# Patient Record
Sex: MALE | Race: WHITE | HISPANIC OR LATINO | URBAN - METROPOLITAN AREA
[De-identification: names, ages, dates, MRNs, and addresses within clinical notes are randomized per-mention and may not be internally consistent; named-entity substitution may affect disease eponyms.]

---

## 2017-04-16 ENCOUNTER — EMERGENCY (EMERGENCY)
Facility: HOSPITAL | Age: 57
LOS: 1 days | Discharge: HOME | End: 2017-04-16
Attending: EMERGENCY MEDICINE | Admitting: EMERGENCY MEDICINE
Payer: COMMERCIAL

## 2017-04-16 VITALS
OXYGEN SATURATION: 96 % | DIASTOLIC BLOOD PRESSURE: 82 MMHG | RESPIRATION RATE: 18 BRPM | HEART RATE: 88 BPM | SYSTOLIC BLOOD PRESSURE: 123 MMHG | TEMPERATURE: 98 F

## 2017-04-16 VITALS
OXYGEN SATURATION: 95 % | TEMPERATURE: 98 F | RESPIRATION RATE: 16 BRPM | DIASTOLIC BLOOD PRESSURE: 81 MMHG | HEART RATE: 83 BPM | SYSTOLIC BLOOD PRESSURE: 107 MMHG

## 2017-04-16 DIAGNOSIS — I48.2 CHRONIC ATRIAL FIBRILLATION: ICD-10-CM

## 2017-04-16 DIAGNOSIS — K92.1 MELENA: ICD-10-CM

## 2017-04-16 DIAGNOSIS — R61 GENERALIZED HYPERHIDROSIS: ICD-10-CM

## 2017-04-16 DIAGNOSIS — E86.0 DEHYDRATION: ICD-10-CM

## 2017-04-16 DIAGNOSIS — R00.2 PALPITATIONS: ICD-10-CM

## 2017-04-16 DIAGNOSIS — Z79.82 LONG TERM (CURRENT) USE OF ASPIRIN: ICD-10-CM

## 2017-04-16 LAB
ALBUMIN SERPL ELPH-MCNC: 4.1 G/DL — SIGNIFICANT CHANGE UP (ref 3.3–5)
ALP SERPL-CCNC: 59 U/L — SIGNIFICANT CHANGE UP (ref 40–120)
ALT FLD-CCNC: 24 U/L RC — SIGNIFICANT CHANGE UP (ref 10–45)
ANION GAP SERPL CALC-SCNC: 10 MMOL/L — SIGNIFICANT CHANGE UP (ref 5–17)
APTT BLD: 34.7 SEC — SIGNIFICANT CHANGE UP (ref 27.5–37.4)
AST SERPL-CCNC: 29 U/L — SIGNIFICANT CHANGE UP (ref 10–40)
BASOPHILS # BLD AUTO: 0 K/UL — SIGNIFICANT CHANGE UP (ref 0–0.2)
BASOPHILS NFR BLD AUTO: 0 % — SIGNIFICANT CHANGE UP (ref 0–2)
BILIRUB SERPL-MCNC: 0.8 MG/DL — SIGNIFICANT CHANGE UP (ref 0.2–1.2)
BUN SERPL-MCNC: 10 MG/DL — SIGNIFICANT CHANGE UP (ref 7–23)
CALCIUM SERPL-MCNC: 9.2 MG/DL — SIGNIFICANT CHANGE UP (ref 8.4–10.5)
CHLORIDE SERPL-SCNC: 106 MMOL/L — SIGNIFICANT CHANGE UP (ref 96–108)
CO2 SERPL-SCNC: 27 MMOL/L — SIGNIFICANT CHANGE UP (ref 22–31)
CREAT SERPL-MCNC: 0.89 MG/DL — SIGNIFICANT CHANGE UP (ref 0.5–1.3)
EOSINOPHIL # BLD AUTO: 0 K/UL — SIGNIFICANT CHANGE UP (ref 0–0.5)
EOSINOPHIL NFR BLD AUTO: 0.4 % — SIGNIFICANT CHANGE UP (ref 0–6)
GAS PNL BLDV: SIGNIFICANT CHANGE UP
GLUCOSE SERPL-MCNC: 97 MG/DL — SIGNIFICANT CHANGE UP (ref 70–99)
HCT VFR BLD CALC: 45.7 % — SIGNIFICANT CHANGE UP (ref 39–50)
HGB BLD-MCNC: 15.7 G/DL — SIGNIFICANT CHANGE UP (ref 13–17)
INR BLD: 1.19 RATIO — HIGH (ref 0.88–1.16)
LYMPHOCYTES # BLD AUTO: 1.2 K/UL — SIGNIFICANT CHANGE UP (ref 1–3.3)
LYMPHOCYTES # BLD AUTO: 26.8 % — SIGNIFICANT CHANGE UP (ref 13–44)
MAGNESIUM SERPL-MCNC: 2 MG/DL — SIGNIFICANT CHANGE UP (ref 1.6–2.6)
MCHC RBC-ENTMCNC: 31 PG — SIGNIFICANT CHANGE UP (ref 27–34)
MCHC RBC-ENTMCNC: 34.5 GM/DL — SIGNIFICANT CHANGE UP (ref 32–36)
MCV RBC AUTO: 89.9 FL — SIGNIFICANT CHANGE UP (ref 80–100)
MONOCYTES # BLD AUTO: 0.5 K/UL — SIGNIFICANT CHANGE UP (ref 0–0.9)
MONOCYTES NFR BLD AUTO: 12 % — SIGNIFICANT CHANGE UP (ref 2–14)
NEUTROPHILS # BLD AUTO: 2.8 K/UL — SIGNIFICANT CHANGE UP (ref 1.8–7.4)
NEUTROPHILS NFR BLD AUTO: 60.8 % — SIGNIFICANT CHANGE UP (ref 43–77)
PLATELET # BLD AUTO: 118 K/UL — LOW (ref 150–400)
POTASSIUM SERPL-MCNC: 3.7 MMOL/L — SIGNIFICANT CHANGE UP (ref 3.5–5.3)
POTASSIUM SERPL-SCNC: 3.7 MMOL/L — SIGNIFICANT CHANGE UP (ref 3.5–5.3)
PROT SERPL-MCNC: 7 G/DL — SIGNIFICANT CHANGE UP (ref 6–8.3)
PROTHROM AB SERPL-ACNC: 13 SEC — HIGH (ref 9.8–12.7)
RBC # BLD: 5.08 M/UL — SIGNIFICANT CHANGE UP (ref 4.2–5.8)
RBC # FLD: 12 % — SIGNIFICANT CHANGE UP (ref 10.3–14.5)
SODIUM SERPL-SCNC: 143 MMOL/L — SIGNIFICANT CHANGE UP (ref 135–145)
TROPONIN T SERPL-MCNC: <0.01 NG/ML — SIGNIFICANT CHANGE UP (ref 0–0.06)
TSH SERPL-MCNC: 1.92 UIU/ML — SIGNIFICANT CHANGE UP (ref 0.27–4.2)
WBC # BLD: 4.5 K/UL — SIGNIFICANT CHANGE UP (ref 3.8–10.5)
WBC # FLD AUTO: 4.5 K/UL — SIGNIFICANT CHANGE UP (ref 3.8–10.5)

## 2017-04-16 PROCEDURE — 71010: CPT | Mod: 26

## 2017-04-16 PROCEDURE — 82435 ASSAY OF BLOOD CHLORIDE: CPT

## 2017-04-16 PROCEDURE — 82330 ASSAY OF CALCIUM: CPT

## 2017-04-16 PROCEDURE — 99285 EMERGENCY DEPT VISIT HI MDM: CPT | Mod: 25

## 2017-04-16 PROCEDURE — 93010 ELECTROCARDIOGRAM REPORT: CPT | Mod: 76

## 2017-04-16 PROCEDURE — 85014 HEMATOCRIT: CPT

## 2017-04-16 PROCEDURE — 85027 COMPLETE CBC AUTOMATED: CPT

## 2017-04-16 PROCEDURE — 84443 ASSAY THYROID STIM HORMONE: CPT

## 2017-04-16 PROCEDURE — 93005 ELECTROCARDIOGRAM TRACING: CPT

## 2017-04-16 PROCEDURE — 85610 PROTHROMBIN TIME: CPT

## 2017-04-16 PROCEDURE — 84484 ASSAY OF TROPONIN QUANT: CPT

## 2017-04-16 PROCEDURE — 84295 ASSAY OF SERUM SODIUM: CPT

## 2017-04-16 PROCEDURE — 83605 ASSAY OF LACTIC ACID: CPT

## 2017-04-16 PROCEDURE — 84132 ASSAY OF SERUM POTASSIUM: CPT

## 2017-04-16 PROCEDURE — 82803 BLOOD GASES ANY COMBINATION: CPT

## 2017-04-16 PROCEDURE — 71045 X-RAY EXAM CHEST 1 VIEW: CPT

## 2017-04-16 PROCEDURE — 99284 EMERGENCY DEPT VISIT MOD MDM: CPT | Mod: 25

## 2017-04-16 PROCEDURE — 82553 CREATINE MB FRACTION: CPT

## 2017-04-16 PROCEDURE — 83735 ASSAY OF MAGNESIUM: CPT

## 2017-04-16 PROCEDURE — 82947 ASSAY GLUCOSE BLOOD QUANT: CPT

## 2017-04-16 PROCEDURE — 80053 COMPREHEN METABOLIC PANEL: CPT

## 2017-04-16 PROCEDURE — 85730 THROMBOPLASTIN TIME PARTIAL: CPT

## 2017-04-16 PROCEDURE — 82550 ASSAY OF CK (CPK): CPT

## 2017-04-16 RX ORDER — SODIUM CHLORIDE 9 MG/ML
1000 INJECTION INTRAMUSCULAR; INTRAVENOUS; SUBCUTANEOUS ONCE
Qty: 0 | Refills: 0 | Status: COMPLETED | OUTPATIENT
Start: 2017-04-16 | End: 2017-04-16

## 2017-04-16 RX ORDER — SODIUM CHLORIDE 9 MG/ML
1000 INJECTION INTRAMUSCULAR; INTRAVENOUS; SUBCUTANEOUS
Qty: 0 | Refills: 0 | Status: DISCONTINUED | OUTPATIENT
Start: 2017-04-16 | End: 2017-04-20

## 2017-04-16 RX ORDER — ASPIRIN/CALCIUM CARB/MAGNESIUM 324 MG
1 TABLET ORAL
Qty: 0 | Refills: 0 | COMMUNITY

## 2017-04-16 RX ADMIN — SODIUM CHLORIDE 1000 MILLILITER(S): 9 INJECTION INTRAMUSCULAR; INTRAVENOUS; SUBCUTANEOUS at 12:54

## 2017-04-16 RX ADMIN — SODIUM CHLORIDE 125 MILLILITER(S): 9 INJECTION INTRAMUSCULAR; INTRAVENOUS; SUBCUTANEOUS at 16:13

## 2017-04-16 RX ADMIN — SODIUM CHLORIDE 1000 MILLILITER(S): 9 INJECTION INTRAMUSCULAR; INTRAVENOUS; SUBCUTANEOUS at 14:00

## 2017-04-16 NOTE — ED PROVIDER NOTE - SHIFT CHANGE DETAILS
**ATTENDING ADDENDUM (Dr. Jamie Nuñez): (1) reassess patient (2) followup delta troponin (3) disposition pending at time of ED handoff, but likely discharge home (low [id number] score; disposition as per discussion with patient's primary care physician/provider/cardiologist)

## 2017-04-16 NOTE — ED PROVIDER NOTE - CARE PLAN
Principal Discharge DX:	Tachycardia  Secondary Diagnosis:	Chronic atrial fibrillation  Secondary Diagnosis:	Dehydration Principal Discharge DX:	Tachycardia  Goal:	ATTENDING ADDENDUM (Dr. Jamie Nuñez): Goals of care include resolution of emergent/urgent symptoms and concerns, and restoration to baseline level of homeostasis.  Instructions for follow-up, activity and diet:	ATTENDING ADDENDUM (Dr. Jamie Nuñez): (1) anticipatory guidance provided  (2) rest  (3) outpatient follow-up with your primary care physician/provider (4) return if symptoms worsen, persist, or do not resolve (5) medications, if indicated, as prescribed  Secondary Diagnosis:	Chronic atrial fibrillation  Secondary Diagnosis:	Dehydration

## 2017-04-16 NOTE — ED PROVIDER NOTE - PLAN OF CARE
ATTENDING ADDENDUM (Dr. Jamie Nuñez): Goals of care include resolution of emergent/urgent symptoms and concerns, and restoration to baseline level of homeostasis. ATTENDING ADDENDUM (Dr. Jamie Nuñez): (1) anticipatory guidance provided  (2) rest  (3) outpatient follow-up with your primary care physician/provider (4) return if symptoms worsen, persist, or do not resolve (5) medications, if indicated, as prescribed

## 2017-04-16 NOTE — ED PROVIDER NOTE - ATTENDING CONTRIBUTION TO CARE
**ATTENDING ADDENDUM (Dr. Jamie Nuñez): I attest that I have directly examined this patient and reviewed and formulated the diagnostic and therapeutic management plan in collaboration with the EM resident. Please see MDM note and remainder of EMR for findings from CC, HPI, ROS, and PE. (Kat)

## 2017-04-16 NOTE — ED PROVIDER NOTE - OBJECTIVE STATEMENT
56yM h/o a fib s/p ablation on ASA presents with palpitations since this morning. Reports 4 days of diarrhea, black and decreased PO intake. Also with recent stress over missing family member. No chest pain, abdominal pain, vomiting, fever. +dizziness upon standing today but no syncope.  Cards- Jamie Trevizo 56yM h/o a fib s/p ablation on ASA presents with palpitations since this morning. Reports 4 days of diarrhea, black and decreased PO intake. Also with recent stress over missing family member. No chest pain, abdominal pain, vomiting, fever. +dizziness upon standing today but no syncope.  Cards- Jamie Trevizo  **ATTENDING ADDENDUM (Dr. Jamie Nuñez): I attest that I have directly and personally interviewed and examined this patient and elicited a comparable history of present illness and review of systems as documented, along with my EM resident. I attest that I have made significant contributions to the documentation where necessary and as noted in the EMR. 56yM h/o a fib s/p ablation on ASA presents with palpitations since this morning. Reports 4 days of diarrhea, black and decreased PO intake. Also with recent stress over missing family member. No chest pain, abdominal pain, vomiting, fever. +dizziness upon standing today but no syncope.  Cards- Jamie Trevizo  **ATTENDING ADDENDUM (Dr. Jamie Nuñez): I attest that I have directly and personally interviewed and examined this patient and elicited a comparable history of present illness and review of systems as documented, along with my EM resident. I attest that I have made significant contributions to the documentation where necessary and as noted in the EMR. Of note, and in addition to the above, patient is a 56-year-old man with history of atrial fibrillation (on aspirin, NO other anticoagulation) presenting with rapid atrial fibrillation following four days of diarrhea and s/p recent travel from [place] one day ago. Patient reports that he had travelled to [place] to console family members in the context of a "missing family member" that disappeared recently. Reports NO fevers or chills. NO syncope or near-syncope. NO hematemesis, hematochezia, or coffee-ground emesis, although diarrhea reported as "black." POSITIVE generalized malaise and fatigue. NO focal or generalized weakness. NO speech or visual changes. NO edema in the bilateral lower extremities. NO chest pain, cough, shortness of breath, dyspnea on exertion, abdominal pain, frequency, urgency, hesitancy, dysuria, or flank/back pain. NO new medications. NO pleuritic or reproducible component to the symptoms. Here for evaluation. VAS 2-3/10.

## 2017-04-16 NOTE — ED PROVIDER NOTE - PHYSICAL EXAMINATION
**ATTENDING ADDENDUM (Dr. Jamie Nueñz): I have reviewed and substantially contributed to the elements of the PE as documented above. I have directly performed an examination of this patient in conjunction with the other members (EM resident/PA/NP) of the patient care team.

## 2017-04-16 NOTE — ED PROVIDER NOTE - CONDUCTED A DETAILED DISCUSSION WITH PATIENT AND/OR GUARDIAN REGARDING, MDM
return to ED if symptoms worsen, persist or questions arise/lab results/radiology results/**ATTENDING ADDENDUM (Dr. Jamie Nuñez): Anticipatory guidance provided./need for outpatient follow-up

## 2017-04-16 NOTE — ED PROVIDER NOTE - PROGRESS NOTE DETAILS
kyleigh Trevizo paged  Kat PGY3 2nd troponin negative. HR normalized after IVF. Electrolytes and TSH WNL. Patient feels better. Discussed with covering cardiologist. Patient will be seen in the office tomorrow. No need to anticoagulate given CHADSVASC score 0. Lengthy discussion with patient and wife at bedside who demonstrated good understanding. Patient eager to go home. Strict return precautions discussed.  Kat PGY3 I have received sign out on this patient, briefly: Pt is a 56yM h/o a fib s/p ablation on ASA presents with palpitations since this AM; presented in afib with mild rvr, which resolved with iv hydration; likely 2/2 to recent diarrhea; patient no feeling much better, trop neg x 2; per prior team plan to dc was d/w her cardiologist.  Pt stable for dc.  -Masoud

## 2017-04-16 NOTE — ED PROVIDER NOTE - MEDICAL DECISION MAKING DETAILS
**ATTENDING MEDICAL DECISION MAKING/SYNTHESIS (Dr. Jamie Nuñez): I have reviewed the Chief Complaint, the HPI, the ROS, and have directly performed and confirmed the findings on the Physical Examination. I have reviewed the medical decision making with all providers, as applicable. The PROBLEM REPRESENTATION at this time is: 56-year-old man with history of atrial fibrillation (on aspirin, NO other anticoagulation) presenting with rapid atrial fibrillation following four days of diarrhea and s/p recent travel from [place] one day ago. The MOST LIKELY DIAGNOSIS, and the LIST OF DIFFERENTIAL DIAGNOSES, includes (but is not limited to) the following: ATRIAL FIBRILLATION CAUSE: chronic (known), pulmonary embolism/deep venous thrombosis (unlikely), dehydration (likely), acute coronary syndrome (possible), electrolyte-metabolic-endocrine derangements (very possible). DIARRHEA: serious bacterial infection or sepsis/severe sepsis (possible but less likely), melena (possible), viral gastroenteritis (likely). The likelihood of each of these diagnoses has been appropriately considered in the context of this patient's presentation and my evaluation. PLAN: as described in EMR, including diagnostics, therapeutics and consultation as clinically warranted. I will continue to reevaluate the patient, including the results of all testing, and monitor response to therapy throughout the patient's course in the ED.

## 2017-04-16 NOTE — ED ADULT NURSE NOTE - OBJECTIVE STATEMENT
57 y/o male c/o palpitations started few hours ago,  states he got up and started having palpitations.  No chest pain, no SOB, no fever, no chills, no dizziness.  Respiratione easy and non labored.  Skin warm and dry. 55 y/o male c/o palpitations since this morning and  diarrhea x 4 days and decreased PO intake. Pt states he felt dizzy upon standing today.  Pt also with recent stress over missing family member. No chest pain, no abdominal pain, no vomiting, no fever.  Respiratione easy and non labored.  Skin warm and dry.

## 2017-04-16 NOTE — ED PROVIDER NOTE - BILATERAL EYES
clear/pupils equal, round, and reactive to light/**ATTENDING ADDENDUM (Dr. Jamie Nuñez): Extraocular muscle movements intact. NO nystagmus.

## 2024-12-20 NOTE — ED PROVIDER NOTE - RESPIRATORY, MLM
From Dr. James's most recent clinic note:  Primary myelofibrosis              A. 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 Breath sounds clear and equal bilaterally. **ATTENDING ADDENDUM (Dr. Jamie Nuñez): NO wheezing, rales, rhonchi, crackles, stridor, drooling, retractions, nasal flaring, or tripoding.